# Patient Record
Sex: MALE | Race: BLACK OR AFRICAN AMERICAN | NOT HISPANIC OR LATINO | URBAN - METROPOLITAN AREA
[De-identification: names, ages, dates, MRNs, and addresses within clinical notes are randomized per-mention and may not be internally consistent; named-entity substitution may affect disease eponyms.]

---

## 2018-05-11 ENCOUNTER — HISTORICAL (OUTPATIENT)
Dept: ADMINISTRATIVE | Facility: HOSPITAL | Age: 48
End: 2018-05-11

## 2018-05-16 LAB
FINAL CULTURE: NORMAL
FINAL CULTURE: NORMAL
GRAM STN SPEC: NORMAL
GRAM STN SPEC: NORMAL

## 2021-03-17 ENCOUNTER — HISTORICAL (OUTPATIENT)
Dept: BARIATRICS | Facility: HOSPITAL | Age: 51
End: 2021-03-17

## 2021-05-14 ENCOUNTER — HISTORICAL (OUTPATIENT)
Dept: ADMINISTRATIVE | Facility: HOSPITAL | Age: 51
End: 2021-05-14

## 2021-08-24 ENCOUNTER — HISTORICAL (OUTPATIENT)
Dept: BARIATRICS | Facility: HOSPITAL | Age: 51
End: 2021-08-24

## 2021-09-09 ENCOUNTER — HISTORICAL (OUTPATIENT)
Dept: BARIATRICS | Facility: HOSPITAL | Age: 51
End: 2021-09-09

## 2021-09-28 ENCOUNTER — HISTORICAL (OUTPATIENT)
Dept: BARIATRICS | Facility: HOSPITAL | Age: 51
End: 2021-09-28

## 2021-11-11 ENCOUNTER — HISTORICAL (OUTPATIENT)
Dept: BARIATRICS | Facility: HOSPITAL | Age: 51
End: 2021-11-11

## 2022-01-19 ENCOUNTER — HISTORICAL (OUTPATIENT)
Dept: BARIATRICS | Facility: HOSPITAL | Age: 52
End: 2022-01-19

## 2022-04-30 NOTE — OP NOTE
Patient:   Binu Horton Jr            MRN: [id number]            FIN: [id number]               Age:   51 years     Sex:  Male     :  1970   Associated Diagnoses:   HEARTBURN; Chronic gastritis   Author:   Jeffrey Barcenas MD      Operative Note   Operative Information   Date/ Time:  2021 08:01:00.     Procedures Performed: Procedure Code   75890- EGD FLEX TRANSORAL DX W/BRUSHING/WASHING (None) on 2021 at 51 Years.  Comments:  2021 7:52 RAISA Allred RN, Minerva ANDRE  auto-populated from documented surgical case, Esophagogastroduodenoscopy.     Preoperative Diagnosis: HEARTBURN (CYA36-GI R12).     Postoperative Diagnosis: HEARTBURN (IMA54-EC R12), Chronic gastritis (LZA75-XW K29.50).     Surgeon: Jeffrey Barcenas MD.     Anesthesia: MAC.     Description of Procedure/Findings/    Complications: Patient was taken to the OR.  The patient's throat was aneshtetized.  MAC was utilized for anesthesia.   was easily passed.  Findings were as follows:  -Oropharynx: normal  -Airway: normal  -Esophagus: normal  -Stomach: normal mucosa except for small area distal superficial gastritis  -Duodenum: normal.     Esimated blood loss: No blood loss.     Complications: None.

## 2022-04-30 NOTE — H&P
Patient:   Binu Horton Jr            MRN: [id number]            FIN: [id number]               Age:   51 years     Sex:  Male     :  1970   Associated Diagnoses:   Morbid obesity   Author:   Deborah Saravia      Basic Information   Source of history:  Self.    History limitation:  None.       Chief Complaint   EGD      History of Present Illness   Mr. Horton is a 52 yo male that presents to [facility] for EGD. He is preparing for sleeve gastrectomy. H Pylori negative. No complaints.       Review of Systems   Constitutional:  Negative.    Eye:  Negative.    Ear/Nose/Mouth/Throat:  Negative.    Respiratory:  Negative.    Cardiovascular:  Negative.    Gastrointestinal:  Negative.    Genitourinary:  Negative.    Hematology/Lymphatics:  Negative.    Endocrine:  Negative.    Immunologic:  Negative.    Musculoskeletal:  Negative.    Integumentary:  Negative.    Neurologic:  Negative.    Psychiatric:  Negative.       Health Status   Allergies:    Allergic Reactions (Selected)  Severity Not Documented  Aspirin- Anaphylactic.   Current medications:  (Selected)   Inpatient Medications  Ordered  Buffered Lidocaine 1% - 1mL syringe: 0.5 mL, form: Injection, ID, As Directed PRN for other (see comment), first dose 21 6:52:00 CDT, May inject 0.5mL at IV site, if not allergic  Lactated Ringers Injection intravenous solution 500 mL: 500 mL, 500 mL, IV, 75 mL/hr, start date 21 6:52:00 CDT   Problem list:    All Problems  2019-nCoV / SNOMED CT 4703223571 / Confirmed  Problem added via discern rule sz_covid_pos_neg  Obesity / SNOMED CT 4549619687 / Probable  Tobacco user / SNOMED CT 185964273 / Confirmed  Hypertension / SNOMED CT 06370712 / Confirmed  LOR - Obstructive sleep apnea / SNOMED CT 7677237502 / Confirmed  Arthritis / SNOMED CT 2976394 / Confirmed  Asthma / SNOMED CT 305341995 / Confirmed  Heart murmur / SNOMED CT 694063473 / Confirmed  Osteoporosis / SNOMED CT 414381402 / Confirmed       Histories   Family History:    No family history items have been selected or recorded.   Procedure history:    hand surgery.  left knee replacement.  left forearm.   Social History        Social & Psychosocial Habits    Alcohol  05/14/2021  Use: Current    Frequency: 1-2 times per year    Substance Use  05/14/2021  Use: Never    Tobacco  05/14/2021  Use: Cigars or pipes but not d    Patient Wants Consult For Cessation Counseling No    Abuse/Neglect  05/10/2021  SHX Any signs of abuse or neglect No    05/14/2021  SHX Any signs of abuse or neglect No  .        Physical Examination   Vital Signs   5/14/2021 6:47 CDT       Temperature Oral          36.9 DegC                             Temperature Oral (calculated)             98.42 DegF                             Peripheral Pulse Rate     65 bpm                             SpO2                      98 %                             Systolic Blood Pressure   153 mmHg  HI                             Diastolic Blood Pressure  87 mmHg                             Mean Arterial Pressure, Cuff              109 mmHg     Measurements from flowsheet : Measurements   5/14/2021 6:56 CDT       Weight Dosing             167.82 kg                             Weight Measured           167.82 kg                             Weight Measured and Calculated in Lbs     369.98 lb                             Height/Length Dosing      203 cm                             Height/Length Measured    203 cm                             Body Mass Index Measured  40.72 kg/m2     General:  Alert and oriented, No acute distress.    Neck:  Supple.    Respiratory:  Lungs are clear to auscultation, Respirations are non-labored.    Cardiovascular:  Normal rate, Regular rhythm.    Gastrointestinal:  Soft.    Musculoskeletal:  Normal range of motion.    Integumentary:  Warm.    Neurologic:  Alert, Oriented.    Psychiatric:  Cooperative, Appropriate mood & affect.       Impression and Plan   Diagnosis      Morbid obesity (NEN75-YX E66.01).     EGD  Dr. Barcenas examined patient, obtained informed consent, and answered all questions.

## 2022-06-16 ENCOUNTER — TELEPHONE (OUTPATIENT)
Dept: BARIATRICS | Facility: HOSPITAL | Age: 52
End: 2022-06-16

## 2023-10-18 ENCOUNTER — TELEPHONE (OUTPATIENT)
Dept: SURGERY | Facility: CLINIC | Age: 53
End: 2023-10-18
Payer: COMMERCIAL

## 2023-10-30 ENCOUNTER — TELEPHONE (OUTPATIENT)
Dept: SURGERY | Facility: CLINIC | Age: 53
End: 2023-10-30
Payer: COMMERCIAL

## 2024-09-06 ENCOUNTER — TELEPHONE (OUTPATIENT)
Dept: SURGERY | Facility: CLINIC | Age: 54
End: 2024-09-06
Payer: COMMERCIAL

## 2024-09-26 ENCOUNTER — OFFICE VISIT (OUTPATIENT)
Dept: SURGERY | Facility: CLINIC | Age: 54
End: 2024-09-26
Payer: COMMERCIAL

## 2024-09-26 VITALS
WEIGHT: 315 LBS | DIASTOLIC BLOOD PRESSURE: 89 MMHG | HEART RATE: 67 BPM | BODY MASS INDEX: 36.45 KG/M2 | HEIGHT: 78 IN | SYSTOLIC BLOOD PRESSURE: 147 MMHG

## 2024-09-26 DIAGNOSIS — E66.9 OBESITY, UNSPECIFIED CLASSIFICATION, UNSPECIFIED OBESITY TYPE, UNSPECIFIED WHETHER SERIOUS COMORBIDITY PRESENT: ICD-10-CM

## 2024-09-26 DIAGNOSIS — Z71.82 EXERCISE COUNSELING: ICD-10-CM

## 2024-09-26 DIAGNOSIS — Z71.3 DIETARY COUNSELING: ICD-10-CM

## 2024-09-26 DIAGNOSIS — R63.5 WEIGHT GAIN: ICD-10-CM

## 2024-09-26 DIAGNOSIS — Z71.3 ENCOUNTER FOR WEIGHT LOSS COUNSELING: ICD-10-CM

## 2024-09-26 DIAGNOSIS — Z90.3 S/P GASTRIC SLEEVE PROCEDURE: Primary | ICD-10-CM

## 2024-09-26 DIAGNOSIS — K21.9 GASTROESOPHAGEAL REFLUX DISEASE, UNSPECIFIED WHETHER ESOPHAGITIS PRESENT: ICD-10-CM

## 2024-09-26 RX ORDER — CETIRIZINE HYDROCHLORIDE 10 MG/1
1 TABLET, CHEWABLE ORAL EVERY MORNING
COMMUNITY

## 2024-09-26 RX ORDER — OMEPRAZOLE 40 MG/1
40 CAPSULE, DELAYED RELEASE ORAL DAILY
Qty: 90 CAPSULE | Refills: 3 | Status: SHIPPED | OUTPATIENT
Start: 2024-09-26 | End: 2025-09-26

## 2024-09-26 RX ORDER — TRAZODONE HYDROCHLORIDE 50 MG/1
TABLET ORAL
COMMUNITY
Start: 2024-02-05

## 2024-09-26 RX ORDER — CYCLOBENZAPRINE HCL 10 MG
10 TABLET ORAL 2 TIMES DAILY PRN
COMMUNITY
Start: 2024-04-25

## 2024-09-26 RX ORDER — HYDROCODONE BITARTRATE AND ACETAMINOPHEN 5; 325 MG/1; MG/1
1 TABLET ORAL EVERY 6 HOURS PRN
COMMUNITY
Start: 2024-09-19

## 2024-09-26 RX ORDER — SEMAGLUTIDE 0.68 MG/ML
INJECTION, SOLUTION SUBCUTANEOUS
COMMUNITY

## 2024-09-26 RX ORDER — ALPRAZOLAM 0.5 MG/1
TABLET ORAL
COMMUNITY

## 2024-09-26 NOTE — PROGRESS NOTES
"Progress Note  Binu Horton Jr.  Chief Complaint   Patient presents with    Follow-up     VSG 9/13/21 - 5 yr       History of Present Illness:  Mr. Binu Horton Jr. is a 54 y.o. male who is 3 years s/p lap gastric sleeve surgery on 9/13/21 by Jeffrey Barcenas MD. Presents today for annual bariatric follow up appt. No complaints with tolerating PO. No dysphagia, odynophagia, GERD, NV, or abd pain. Regular BMs.     - Currently on ozempic, has done well on it without any issues. Has lost a little weight on it.    - Shoulder replacement in February and felt like he got off track with diet and exercise at that point. Pt also has an irregular work schedule that he cannot get in a better routine.     - gerd but controlled if takes OTC prilosec.     Diet: not compliant with bariatric meal plan  Exercise: no dedicated regular exercise  Vitamins: not compliant with bariatric supplementation.         Labs (9/16/24): WBC: 3.8, A1c: 5.6%, all other labs WNLs.       Labs (2/5/24): A1c: 6.4%        HT: 80in  Weights:   Trend Weights BMI   Starting 400#    6 Month 286# 31   3 Year 323# 35             For Adults 20 Years and Older:  BMI Weight Status   Below 18.5 Underweight   18.6-24.9 Normal/Healthy   25.0-29.9 Overweight   30.0 & Above Obese     Ideal Weight Range for Your Height: 6'4" = 156 - 204 lbs       Pertinent History:  HTN - [] Yes   [x] No    [] Resolved  HLD - [] Yes   [x] No    [] Resolved  DM  -  [x] Yes   [] No    [] Resolved  LOR - [] Yes   [x] No   [] Resolved  GERD - [x] Yes   [] No [] Resolved  Anticoagulation- [] Yes   [x] No      If yes, type: [] ASA [] Plavix [] Other    Patient Care Team:  No, Primary Doctor as PCP - General  Jeffrey Barcenas MD as Surgeon (Bariatrics)  Stone Grullon MD (Family Medicine)  Specialist, [facility]     Subjective:     12 point review of systems conducted, negative except as stated in the history of present illness. See HPI for details.    Review " "of patient's allergies indicates:   Allergen Reactions    Aspirin      Past Medical History:   Diagnosis Date    Arthritis     Asthma     Diabetes mellitus     Heart murmur     Hypertension     Osteoporosis     Sleep apnea, unspecified     Tobacco user     Uses nebulizer and inhaler at home      Past Surgical History:   Procedure Laterality Date    EYE SURGERY      HAND SURGERY      LAPAROSCOPIC SLEEVE GASTRECTOMY      left knee replacement      SHOULDER SURGERY       Family History   Problem Relation Name Age of Onset    Diabetes Father      Hypertension Father      Leukemia Father       Social History     Tobacco Use    Smoking status: Some Days     Types: Cigars    Smokeless tobacco: Never   Substance Use Topics    Alcohol use: Yes      Current Outpatient Medications   Medication Instructions    ALPRAZolam (XANAX) 0.5 MG tablet Oral    cetirizine 10 mg chewable tablet 1 tablet, Oral, Every morning    cyclobenzaprine (FLEXERIL) 10 mg, Oral, 2 times daily PRN    HYDROcodone-acetaminophen (NORCO) 5-325 mg per tablet 1 tablet, Oral, Every 6 hours PRN    multivit-minerals/FA/lycopene (ONE-A-DAY MEN'S ORAL) Oral, Plus calcium     omeprazole (PRILOSEC) 40 mg, Oral, Daily    OZEMPIC 0.25 mg or 0.5 mg (2 mg/3 mL) pen injector Subcutaneous    traZODone (DESYREL) 50 MG tablet One to two tablets once a day as needed for sleep       Objective:     Vital Signs (Most Recent):  Visit Vitals  BP (!) 147/89   Pulse 67   Ht 6' 8" (2.032 m)   Wt (!) 146.5 kg (323 lb)   BMI 35.48 kg/m²     Physical Exam:  General:  Alert and oriented.    Respiratory:  Lungs are clear to auscultation, Respirations are non-labored, Breath sounds are equal.    Cardiovascular:  Normal rate, Regular rhythm, No murmur.    Gastrointestinal:  Soft, Non-tender, Non-distended, Normal bowel sounds        Musculoskeletal:  Normal range of motion, Normal strength.    Integumentary:  Warm, Dry, Pink.    Neurologic:  Alert, Oriented.    Psychiatric:  Cooperative.  "     Assessment:       ICD-10-CM ICD-9-CM   1. S/P gastric sleeve procedure  Z90.3 V45.75   2. Obesity, unspecified classification, unspecified obesity type, unspecified whether serious comorbidity present  E66.9 278.00   3. Exercise counseling  Z71.82 V65.41   4. Encounter for weight loss counseling  Z71.3 V65.3   5. Dietary counseling  Z71.3 V65.3   6. Gastroesophageal reflux disease, unspecified whether esophagitis present  K21.9 530.81   7. Weight gain  R63.5 783.1       Plan:     1. S/P gastric sleeve procedure        2. Obesity, unspecified classification, unspecified obesity type, unspecified whether serious comorbidity present        3. Exercise counseling        4. Encounter for weight loss counseling        5. Dietary counseling        6. Gastroesophageal reflux disease, unspecified whether esophagitis present        7. Weight gain          - refer to Dr. Lentz for EGD and screening colonoscopy. Pt is unsure of his last colonoscopy. Hx gerd  - discuss with PCP about increasing ozempic to help with weight control  - refilled omeprazole for gerd control  - gave pt back on track diet plan and long term structured meal plan.   - Discussed possible surgical risks with patient that can occur at any point in time such as but not limited to vitamin and mineral deficiency, ulceration from smoking/NSAIDs/Anti-inflammatory medications, gallbladder disfunction, etc. If patient has any severe abd pain that is constant, nausea, vomiting, disruption of bowel movements, or has other concerns they are instructed to call the office or present to the emergency room. Pt verbalized understanding   - F/U 1 year with bariatric labs (annually)  - Continue exercising and following bariatric vitamin supplementation  - Support group attendance encouraged   - Keep routine appts with PCP/specialists as scheduled